# Patient Record
Sex: FEMALE | Race: WHITE
[De-identification: names, ages, dates, MRNs, and addresses within clinical notes are randomized per-mention and may not be internally consistent; named-entity substitution may affect disease eponyms.]

---

## 2017-08-15 NOTE — ED
Abdominal Pain/Female





- HPI Summary


HPI Summary: 


Pt here w/ Lt/central epigastric/lower chest pain x 6.5 weeks. Worse tonight so 

came in for evaluation. Associated sx of intermittent SOB. Area is TTP. Her PT 

tried kinesiotape over upper back to see if this would alleviate frontal pain 

if ILENE/myofascial - no change. Reports a 10 year h/o ab pain since after having 

her 1st child. She reports 5 pregnancies - 1 , the rest FT w/o 

complications, vaginal births. She has routine menstrual cycle, non painful and 

normal flow/duration. No known h/o ovarian cysts- has had TVUS w/o acute 

findings. Associated sx tonight are SOB. Over the past 10 years however  she's 

experienced nausea and diarrhea. Had one time of bloody stool - was painful and 

so she assumed she had a minor tear from constipation - no colonoscopy. Also 

concerned she could have giardia as someone close to her had it and she was 

never tested or treated. Admits she currently lives in a "camping-style" 

arrangement. Reports a 1 x h/o itchy rectum - after wiping repeatedly, she the 

developed a yeast infection. Reports she does not have vaginal pain, irritation

, itching at this time and has not concern for STD as she's been monogomous w/ 

her  over the past 10 years. She uses condoms for birth control and no 

accidents to concern her to the possibilities being pregnant. Denies dysuria, 

urinary frequency, flank pain, hematuria. She has been alternating with hot/

cold feelings for a while now - saw PCP who assessed her thyroid through blood 

work (normal per pt). She also states she had other nutritional values checked  

(Vit b12, iron, etc) which were normal except for Vit D - she is taking a 

supplement now. For her chronic ab pain, she has tried diet modifications and 

only thing she noticed was less bloating w/ elimination diets - no less pain. 

She has also tried probiotics and prebiotics which make no difference in sx/

stools. She eventually saw a GI specialist who dx'd her w/ IBS - offered 

medication for cramping which she hasn't tried. Has not tried an SSRI nor 

antacid, PPI. Consumes a primarily rice, quinoa and vegetable diet w/ 1 cup of 

tea per day. No ETOH. No known fam h/o GI issues although she does not know her 

father nor his medical hx. Admits she smokes intermittently. No hormones, no 

trauma and no recent travel.





- History of Current Complaint


Chief Complaint: Rocky


Stated Complaint: UPPER ABD PAIN


Time Seen by Provider: 08/15/17 20:59


Hx Obtained From: Patient


Hx Last Menstrual Period: 16


Pain Intensity: 4


Allergies/Adverse Reactions: 


 Allergies











Allergy/AdvReac Type Severity Reaction Status Date / Time


 


No Known Allergies Allergy   Verified 16 17:19














PMH/Surg Hx/FS Hx/Imm Hx


Previously Healthy: Yes


Endocrine/Hematology History: Reports: Hx Anemia - bruises easily; h/o Fe def, 

not currently per labs through PCP


   Denies: Hx Anticoagulant Therapy, Hx Blood Disorders, Hx Diabetes, 

Autoimmune Disease


Cardiovascular History: 


   Denies: Hx Congenital Heart Disease, Hx Embolism, Hx Rheumatic Fever, Hx 

Valvular Heart Disease


Respiratory History: 


   Denies: Hx Asthma


GI History: Reports: Hx Irritable Bowel - recent GI dx


   Denies: Hx Cirrhosis, Hx Crohn's Disease, Hx Diverticulosis, Hx Gall Bladder 

Disease, Hx Gastroesophageal Reflux Disease, Hx Hiatal Hernia, Hx Obstructive 

Bowel, Hx Pyloric Stenosis, Hx Ulcer


Psychiatric History: 


   Denies: Hx Anxiety, Hx Depression


Infectious Disease History: No


Infectious Disease History: 


   Denies: Traveled Outside the [place] in Last 30 Days





- Family History


Known Family History: Positive: Other - lung cancer


   Negative: Cardiac Disease, Hypertension, Diabetes





- Social History


Lives: With Family -  and children


Alcohol Use: None


Hx Substance Use: No


Substance Use Type: Reports: None


Hx Tobacco Use: Yes


Smoking Status (MU): Current Some Day Smoker





Review of Systems


Constitutional: Other - see HPI


Eyes: Negative


ENT: Negative


Cardiovascular: Other - see HPI


Respiratory: Other - see HPI


Gastrointestinal: Other - see HPI


Genitourinary: Negative


Negative: burning, dysuria, discharge, frequency, flank pain, hematuria, 

incontinence


Musculoskeletal: Negative


Skin: Negative


Positive: Headache - intermittent but feels this is from lack of eating


Positive: Anxious


All Other Systems Reviewed And Are Negative: Yes





Physical Exam


Triage Information Reviewed: Yes


Vital Signs On Initial Exam: 


 Initial Vitals











Temp Pulse Resp BP Pulse Ox


 


 99 F   87   18   120/75   99 


 


 08/15/17 20:03  08/15/17 20:03  08/15/17 20:03  08/15/17 20:03  08/15/17 20:03











Vital Signs Reviewed: Yes


Appearance: Positive: Well-Appearing, No Pain Distress, Thin


Skin: Positive: Warm, Dry - no ecchymosis observed


Head/Face: Positive: Normal Head/Face Inspection


Eyes: Positive: Normal, EOMI, Conjunctiva Clear - pink mucosa


ENT: Positive: Normal ENT inspection, Hearing grossly normal, Pharynx normal, 

TMs normal


Dental: Negative: Abscess @


Neck: Positive: Supple, Nontender, No Lymphadenopathy - no gross thyromegaly


Respiratory/Lung Sounds: Positive: Clear to Auscultation, Breath Sounds 

Present.  Negative: Rales, Rhonchi, Wheezes


Cardiovascular: Positive: Normal, RRR, Pulses are Symmetrical in both Upper and 

Lower Extremities, S1, S2.  Negative: Murmur, Rub, Leg Edema Left, Leg Edema 

Right


Abdomen Description: Positive: No Organomegaly, Soft, Other: - palpation over 

RUQ radiates pain to epigastric and LUQ - LUQ and epigastric region TTP - also 

reports general tenderness in lower ab (chronic) no rebounding.  Negative: CVA 

Tenderness (R), CVA Tenderness (L), McBurney's Point Tenderness, Pulsatile Mass

, Splenomegaly


Bowel Sounds: Positive: Present


Musculoskeletal: Positive: Normal, Strength/ROM Intact


Neurological: Positive: Normal, Sensory/Motor Intact, Alert, Oriented to Person 

Place, Time, CN Intact II-III


Psychiatric: Positive: Anxious





- [place] Coma Scale


Coma Scale Total: 15





Diagnostics





- Vital Signs


 Vital Signs











  Temp Pulse Resp BP Pulse Ox


 


 08/15/17 20:52   69    99


 


 08/15/17 20:50     109/75 


 


 08/15/17 20:03  99 F  87  18  120/75  99














- Laboratory


Result Diagrams: 


 08/15/17 22:21





 08/15/17 22:21


Lab Statement: Any lab studies that have been ordered have been reviewed, and 

results considered in the medical decision making process.





Re-Evaluation





- Re-Evaluation


  ** First Eval


Change: Unchanged - no relief w/ GI cocktail - she is open to trying pepcid 

however as a diagnostic therapeutic





Abdominal Pain Fem Course/Dx





- Course


Course Of Treatment: Pt presents w/ 6.5 week h/o LUQ/lower chest pain causing 

her to be afraid of eating. Has been seen by PCP for 10 yr h/o ab pain w/o 

known cause although it was suggested she has IBS by a GI specialist and try 

bentyl  which she hasn't. She has tried many other remedies from diet changes 

to probiotics w/o relief. Life threatening and non-life threatening patholioges 

were ruled out tonight - pt is aware further testing is in order. She will f/u w

/ PCP and return to ED w/ danger s/sx.





- Diagnoses


Provider Diagnoses: 


 LUQ abdominal pain








Discharge





- Discharge Plan


Condition: Stable


Disposition: HOME


Patient Education Materials:  Chronic Abdominal Pain (ED)


Referrals: 


Meri Babb NP [Primary Care Provider] - 


Additional Instructions: 


Your tests tonight are all negative for acute pathology. It was discussed you 

may benefit from further assessment of GI disorders such as H. pylori, giardia, 

etc. Your PCP may order these tests for you. You also reported a history of 

rectal bleeding. This should be followed by means of a colonoscopy.





Call your PCP tomorrow to schedule an appointment for further work up.


*If in the meantime you develop fever, vomiting, bloody diarrhea, severe 

abdominal pain, shortness of breath, return to ED

## 2017-08-16 NOTE — RAD
INDICATION: Short of breath. Lower chest pain     



COMPARISON: None

 

TECHNIQUE: PA and lateral dual-energy views were obtained.



FINDINGS: 



Bones/Soft Tissues: There are no acute bony findings. There is mild kyphoscoliosis.



Cardiomediastinal: The cardiomediastinal silhouette is normal. 



Lungs: There are no infiltrates.



Pleura: There are no pleural effusions. 



Other: None



IMPRESSION:  NO ACTIVE DISEASE.

## 2018-12-05 ENCOUNTER — HOSPITAL ENCOUNTER (EMERGENCY)
Dept: HOSPITAL 25 - ED | Age: 33
Discharge: HOME | End: 2018-12-05
Payer: COMMERCIAL

## 2018-12-05 VITALS — SYSTOLIC BLOOD PRESSURE: 121 MMHG | DIASTOLIC BLOOD PRESSURE: 69 MMHG

## 2018-12-05 DIAGNOSIS — F17.210: ICD-10-CM
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
## 2018-12-05 DIAGNOSIS — S69.92XA: Primary | ICD-10-CM

## 2018-12-05 PROCEDURE — 99282 EMERGENCY DEPT VISIT SF MDM: CPT

## 2018-12-05 NOTE — ED
Upper Extremity Pain





- HPI Summary


HPI Summary: 





Patient complains of a heavy object landing on her left wrist 1 week ago with 

progressive left wrist pain since event.  Pain worse with flexion of left 

wrist.  Denies any other pain, injury or symptoms.





- History of Current Complaint


Chief Complaint: EDExtremityUpper


Stated Complaint: LT WRIST INJURY


Time Seen by Provider: 12/05/18 22:06


Hx Obtained From: Patient


Hx Last Menstrual Period: 11/4/16


Mechanism Of Injury: Blunt Trauma


Onset/Duration: Started Days Ago


Timing: Constant


Severity Initially: Moderate


Severity Currently: Moderate


Pain Location: Wrist


Character: Aching, Throbbing


Aggravating Factor(s): Movement, Flexion


Alleviating Factor(s): Rest


Associated Signs & Symptoms: Positive: Negative





- Allergies/Home Medications


Allergies/Adverse Reactions: 


 Allergies











Allergy/AdvReac Type Severity Reaction Status Date / Time


 


No Known Allergies Allergy   Verified 12/05/18 21:50











Home Medications: 


 Home Medications





NK [No Home Medications Reported]  12/05/18 [History Confirmed 12/05/18]











PMH/Surg Hx/FS Hx/Imm Hx


Endocrine/Hematology History: Reports: Hx Anemia - bruises easily; h/o Fe def, 

not currently per labs through PCP


   Denies: Hx Anticoagulant Therapy, Hx Blood Disorders, Hx Diabetes


Cardiovascular History: 


   Denies: Hx Congenital Heart Disease, Hx Embolism, Hx Hypertension, Hx 

Rheumatic Fever, Hx Valvular Heart Disease


Respiratory History: 


   Denies: Hx Asthma


GI History: Reports: Hx Irritable Bowel - recent GI dx


   Denies: Hx Cirrhosis, Hx Crohn's Disease, Hx Diverticulosis, Hx Gall Bladder 

Disease, Hx Gastroesophageal Reflux Disease, Hx Hiatal Hernia, Hx Obstructive 

Bowel, Hx Pyloric Stenosis, Hx Ulcer


Psychiatric History: 


   Denies: Hx Anxiety, Hx Depression


Infectious Disease History: No


Infectious Disease History: 


   Denies: Traveled Outside the [place] in Last 30 Days





- Family History


Known Family History: Positive: None, Other - lung cancer


   Negative: Cardiac Disease, Hypertension, Diabetes





- Social History


Alcohol Use: None


Hx Substance Use: No


Substance Use Type: Reports: None


Hx Tobacco Use: Yes


Smoking Status (MU): Current Every Day Smoker





Review of Systems


Constitutional: Negative


Eyes: Negative


ENT: Negative


Cardiovascular: Negative


Respiratory: Negative


Gastrointestinal: Negative


Genitourinary: Negative


Musculoskeletal: Other


Skin: Negative


Neurological: Negative


Psychological: Normal


All Other Systems Reviewed And Are Negative: Yes





Physical Exam





- Summary


Physical Exam Summary: 





No snuffbox tenderness.  No axial load tenderness.  No ecchymosis, swelling, 

erythema, extra warmth, deformity noted to left wrist.  PMS intact distally.  

Normal  strength.


Triage Information Reviewed: Yes


Vital Signs On Initial Exam: 


 Initial Vitals











Temp Pulse Resp BP Pulse Ox


 


 98.2 F   81   16   116/74   99 


 


 12/05/18 21:47  12/05/18 21:47  12/05/18 21:47  12/05/18 21:47  12/05/18 21:47











Vital Signs Reviewed: Yes


Appearance: Positive: Well-Appearing


Skin: Positive: Warm


Head/Face: Positive: Normal Head/Face Inspection


Eyes: Positive: Normal


Neck: Positive: Supple


Respiratory/Lung Sounds: Positive: Clear to Auscultation


Cardiovascular: Positive: Normal


Abdomen Description: Positive: Nontender


Musculoskeletal: Positive: Normal


Neurological: Positive: Normal


Psychiatric: Positive: Normal


AVPU Assessment: Alert





- [place] Coma Scale


Best Eye Response: 4 - Spontaneous


Best Motor Response: 6 - Obeys Commands


Best Verbal Response: 5 - Oriented


Coma Scale Total: 15





Diagnostics





- Vital Signs


 Vital Signs











  Temp Pulse Resp BP Pulse Ox


 


 12/05/18 21:47  98.2 F  81  16  116/74  99














- Laboratory


Lab Statement: Any lab studies that have been ordered have been reviewed, and 

results considered in the medical decision making process.





Course/Dx





- Course


Course Of Treatment: Patient complains of a heavy object landing on her left 

wrist 1 week ago with progressive left wrist pain since event.  Pain worse with 

flexion of left wrist.  Denies any other pain, injury or symptoms.  Physical 

exam:No snuffbox tenderness.  No axial load tenderness.  No ecchymosis, swelling

, erythema, extra warmth, deformity noted to left wrist.  PMS intact distally.  

Normal  strength.  Wrist x-ray unremarkable.  Patient's provided with wrist 

brace.  Follow-up with orthopedics.





- Diagnoses


Provider Diagnoses: 


 Wrist injury








Discharge





- Sign-Out/Discharge


Documenting (check all that apply): Patient Departure





- Discharge Plan


Condition: Stable


Disposition: HOME


Patient Education Materials:  Wrist Injury (ED)


Referrals: 


Meri Babb NP [Primary Care Provider] - 


Atif Issa MD [Medical Doctor] - 


Additional Instructions: 


If symptoms persist follow-up with orthopedics Dr Issa for further 

evaluation.  Return to the ED for any new or worsening symptoms





- Billing Disposition and Condition


Condition: STABLE


Disposition: Home